# Patient Record
Sex: MALE | Race: WHITE | Employment: PART TIME | URBAN - METROPOLITAN AREA
[De-identification: names, ages, dates, MRNs, and addresses within clinical notes are randomized per-mention and may not be internally consistent; named-entity substitution may affect disease eponyms.]

---

## 2018-08-16 ENCOUNTER — HOSPITAL ENCOUNTER (EMERGENCY)
Age: 28
Discharge: HOME | End: 2018-08-16
Attending: EMERGENCY MEDICINE

## 2018-08-16 VITALS
HEART RATE: 78 BPM | DIASTOLIC BLOOD PRESSURE: 77 MMHG | OXYGEN SATURATION: 100 % | RESPIRATION RATE: 18 BRPM | SYSTOLIC BLOOD PRESSURE: 165 MMHG | BODY MASS INDEX: 37.93 KG/M2 | HEIGHT: 72 IN | TEMPERATURE: 97.8 F | WEIGHT: 280 LBS

## 2018-08-16 DIAGNOSIS — R21 RASH: Primary | ICD-10-CM

## 2018-08-16 PROCEDURE — 99282 EMERGENCY DEPT VISIT SF MDM: CPT

## 2018-08-16 RX ORDER — CLOTRIMAZOLE 1 %
CREAM (GRAM) TOPICAL
Qty: 1 TUBE | Refills: 1 | Status: SHIPPED | OUTPATIENT
Start: 2018-08-16 | End: 2018-08-23

## 2018-08-16 NOTE — ED NOTES
Pt discharged from ED in stable, ambulatory condition. Discharge instructions explained, all questions answered. Prescriptions given. Pt walked to [facility] independently.        Maritza Caraballo RN  08/16/18 [id number]

## 2018-08-16 NOTE — ED PROVIDER NOTES
[address] [facility]          ATTENDING PHYSICIAN NOTE       Date of evaluation: 8/16/2018    Chief Complaint     Rash      History of Present Illness     Annabelle Valdovinos is a 29 y.o. male who presents With rash to posterior neck. He's had it for quite a while but is getting bigger. He said ringworm in the past and thinks this is what it is. No other symptoms. Review of Systems     Review of Systems     Here for rash. No shortness of breath. No other rashes reported. Past Medical, Surgical, Family, and Social History     He has a past medical history of Back pain and Wrist fracture. He has a past surgical history that includes other surgical history. His family history is not on file. He reports that he has been smoking Cigarettes. He has been smoking about 0.50 packs per day. He has never used smokeless tobacco. He reports that he does not drink alcohol or use drugs. Medications     Previous Medications    No medications on file       Allergies     He is allergic to pcn [penicillins]. Physical Exam     INITIAL VITALS: BP: (!) 165/77, Temp: 97.8 °F (36.6 °C), Pulse: 78, Resp: 18, SpO2: 100 %   Physical Exam   Constitutional: No distress. HENT:   Head: Normocephalic. Eyes: Pupils are equal, round, and reactive to light. Neck: Neck supple. Positive rash noted. neck consistent with tinea/ringworm. Pulmonary/Chest: Effort normal.   Musculoskeletal: Normal range of motion. Skin: Skin is warm and dry. see neck exam   Psychiatric: He has a normal mood and affect. Diagnostic Results     EKG       RADIOLOGY:  No orders to display       LABS:   No results found for this visit on 08/16/18. ED BEDSIDE ULTRASOUND:      RECENT VITALS:  BP: (!) 165/77, Temp: 97.8 °F (36.6 °C), Pulse: 78, Resp: 18, SpO2: 100 %     Procedures         ED Course     Nursing Notes, Past Medical Hx, Past Surgical Hx, Social Hx, Allergies, and Family Hx were reviewed.     The patient was given the following medications:  Orders Placed This Encounter   Medications    clotrimazole (LOTRIMIN) 1 % cream     Sig: Apply topically 3 times daily to lesions on skin. Dispense:  1 Tube     Refill:  1       CONSULTS:  None    MEDICAL DECISION MAKING / ASSESSMENT / Angle Miguel Angel is a 29 y.o. male  who presents emergency department with above symptoms and findings. Prescription written for clotrimazole 1% t.i.d. to rash. Patient will follow-up as needed and instructed him to find a dermatologist if this recurs and to find a family physician. Discharged in good condition with rash instructions      Clinical Impression     1. Rash        Disposition     PATIENT REFERRED TO:  [facility] Emergency Department  [address] [place] [address]  [phone]    If symptoms worsen      DISCHARGE MEDICATIONS:  New Prescriptions    CLOTRIMAZOLE (LOTRIMIN) 1 % CREAM    Apply topically 3 times daily to lesions on skin.        DISPOSITION Decision To Discharge 08/16/2018 12:37:57 PM       Curtis Song MD  08/16/18 [id number]

## 2018-08-16 NOTE — ED TRIAGE NOTES
Pt arrived to ED with rash on back of neck that has been getting worse over the past 3 months. Denies pain at this time, states there is only pain when it gets wet with sweat. States \"I think I have ringworm. \"

## 2019-08-17 ENCOUNTER — HOSPITAL ENCOUNTER (EMERGENCY)
Age: 29
Discharge: HOME | End: 2019-08-17
Attending: EMERGENCY MEDICINE

## 2019-08-17 VITALS
DIASTOLIC BLOOD PRESSURE: 88 MMHG | HEART RATE: 89 BPM | OXYGEN SATURATION: 100 % | WEIGHT: 280 LBS | RESPIRATION RATE: 18 BRPM | BODY MASS INDEX: 37.93 KG/M2 | HEIGHT: 72 IN | TEMPERATURE: 97.9 F | SYSTOLIC BLOOD PRESSURE: 149 MMHG

## 2019-08-17 DIAGNOSIS — K60.2 ANAL FISSURE: Primary | ICD-10-CM

## 2019-08-17 PROCEDURE — 99282 EMERGENCY DEPT VISIT SF MDM: CPT

## 2019-08-17 RX ORDER — DOCUSATE SODIUM 100 MG/1
100 CAPSULE, LIQUID FILLED ORAL 2 TIMES DAILY
Qty: 30 CAPSULE | Refills: 0 | Status: SHIPPED | OUTPATIENT
Start: 2019-08-17

## 2019-08-17 NOTE — ED PROVIDER NOTES
[facility] EMERGENCY DEPARTMENT NOTE  Patient Name: Sony Duval  Patient Age/Sex: 34 y.o.male  DOS: 8/17/2019  6:43 PM  PCP: [facility] None  Insurance: Payor: /     Triage CC:   Rectal Bleeding    HPI:   This is a 34 y.o. male who presents to the emergency department complaining of rectal bleeding. He states that 2 days ago he was constipated and was passing a hard stool when he felt a sudden sharp pain in his anus and noticed that there was some bright red blood that was streaking on the outside of one part of his stool. This was present again today. He states that he fell down there and states that it feels like there is a \"scab\" on his anus. He reports that this is occurred to him previously. He does not have any fevers, chills, abdominal pain, lightheadedness, dizziness, nausea, vomiting, or any other complaints. He reports that he eats a diet heavy in meat and does not have much fiber. He reports no bleeding intermittently when he is not having a bowel movement. ROS:  Pertinent items are noted in HPI. All other systems reviewed and negative, except as stated in HPI. Physical Exam:  Constitutional: Well-developed, well-nourished. Head: Normocephalic, atraumatic. Eyes: PERRL, EOMI, normal sclera. Gastrointestinal: Abdomen soft, NT/ND, no guarding or rigidity. Integumentary: Warm, well-perfused skin. No concerning rashes noted. Rectal: There is a small posterior fissure with a small amount of active bleeding during examination. No hemorrhoids are identified. Neurologic: A&Ox4  Psychiatric: Appropriate mood and affect. Vitals:    08/17/19 1851   BP: (!) 149/88   Pulse: 89   Resp: 18   Temp: 97.9 °F (36.6 °C)   SpO2: 100%   Weight: 280 lb (127 kg)   Height: 6' (1.829 m)     MDM & ED Course:  ED Course as of Aug 17 1946   Sat Aug 17, 2019   1945 This is a 66-year-old man with a anal fissure from constipation.   No other abdominal pain, fevers, or other concerning signs for GI bleeding elsewhere. No signs of symptomatic anemia. Normal vitals. He was prescribed Colace and given nonpharmacologic instructions. Strict return precautions given. [MS]      ED Course User Index  [MS] Alla Mcbride MD       ED Administered Medications:  Medications - No data to display  Impression:  1. Anal fissure          Reviewed available internal / external medical records and test results: YES  Reviewed RN documentation: YES  Certified  used: No    The patient was evaluated by myself and the ED Attending Physician. All management and disposition plans were discussed and agreed upon. Alla Mcbride MD  Emergency Medicine, PGY-4  Phone: [phone]    This note was dictated using voice-recognition software, which occasionally construes inadvertent typographic errors. ~~~~~~~~~~~~~~~~~~~~~~~~~~~~~~~~~~~~~~~~~~~~~~~~~~~~~~~~~~~~~~~~~~~~~~~~~  ~~~~~~~~~~~~~~~~~~~~~~~~~~~~~~~~~~~~~~~~~~~~~~~~~~~~~~~~~~~~~~~~~~~~~~~~~    Allergies: Allergies as of 08/17/2019 - Review Complete 08/17/2019   Allergen Reaction Noted    Pcn [penicillins]  07/17/2011     PMH:  Past Medical History:   Diagnosis Date    Back pain     Wrist fracture      PSH:  Past Surgical History:   Procedure Laterality Date    OTHER SURGICAL HISTORY      GI surgery as an infant       Family Hx:  History reviewed. No pertinent family history. Social Hx:  Social History     Tobacco History     Smoking Status  Current Every Day Smoker Smoking Frequency  0.5 packs/day Smoking Tobacco Type  Cigarettes    Smokeless Tobacco Use  Never Used          Alcohol History     Alcohol Use Status  No          Drug Use     Drug Use Status  No          Sexual Activity     Sexually Active  Not Asked                Labs:  Please review the electronic medical record for laboratory record.     Imaging:  No orders to display          Alla Mcbride MD  Resident  08/17/19 [id number]